# Patient Record
Sex: FEMALE | Race: WHITE | NOT HISPANIC OR LATINO | Employment: OTHER | URBAN - METROPOLITAN AREA
[De-identification: names, ages, dates, MRNs, and addresses within clinical notes are randomized per-mention and may not be internally consistent; named-entity substitution may affect disease eponyms.]

---

## 2017-05-16 ENCOUNTER — GENERIC CONVERSION - ENCOUNTER (OUTPATIENT)
Dept: OTHER | Facility: OTHER | Age: 82
End: 2017-05-16

## 2018-10-11 ENCOUNTER — OFFICE VISIT (OUTPATIENT)
Dept: GASTROENTEROLOGY | Facility: CLINIC | Age: 83
End: 2018-10-11
Payer: MEDICARE

## 2018-10-11 VITALS
HEART RATE: 60 BPM | DIASTOLIC BLOOD PRESSURE: 80 MMHG | WEIGHT: 90 LBS | BODY MASS INDEX: 18.14 KG/M2 | RESPIRATION RATE: 16 BRPM | HEIGHT: 59 IN | SYSTOLIC BLOOD PRESSURE: 140 MMHG

## 2018-10-11 DIAGNOSIS — D64.9 ANEMIA, UNSPECIFIED TYPE: ICD-10-CM

## 2018-10-11 DIAGNOSIS — Z87.74 PERSONAL HISTORY OF ARTERIAL VENOUS MALFORMATION (AVM): Primary | ICD-10-CM

## 2018-10-11 PROCEDURE — 99214 OFFICE O/P EST MOD 30 MIN: CPT | Performed by: INTERNAL MEDICINE

## 2018-10-11 RX ORDER — LOSARTAN POTASSIUM 100 MG/1
100 TABLET ORAL DAILY
COMMUNITY

## 2018-10-11 RX ORDER — OMEGA-3 FATTY ACIDS/FISH OIL 300-1000MG
CAPSULE ORAL
COMMUNITY
End: 2019-12-31 | Stop reason: ALTCHOICE

## 2018-10-11 RX ORDER — MELATONIN
1000 DAILY
COMMUNITY

## 2018-10-11 RX ORDER — SPIRONOLACTONE 100 MG/1
100 TABLET, FILM COATED ORAL DAILY
COMMUNITY
End: 2019-12-31 | Stop reason: ALTCHOICE

## 2018-10-11 NOTE — LETTER
October 11, 2018     Hugo Maradiaga MD  [address] [facility] [address] [facility]    Patient: John Razo   YOB: 1919   Date of Visit: 10/11/2018       Dear Dr Astrid Luciano: Thank you for referring Jonny Mackay to me for evaluation  Below are my notes for this consultation  If you have questions, please do not hesitate to call me  I look forward to following your patient along with you  Sincerely,        Jean Tran MD        CC: No Recipients  Jean Tran MD  10/11/2018  2:23 PM  Sign at close encounter  [facility]      Chief Complaint: history of arterial venous malformation, recent low blood count    HPI:  John Razo is a 80 y o   female who presents with history of arterial venous malformation and recent abnormal blood count  Three weeks ago patient had blood work performed at [facility] and she was told that she had a low blood count, results are not currently available to me  It was discussed she have her physician there to please send office notes and blood work to me  She was evaluated for this 5 years ago in 2013 and was 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 which was treated with cauterization during colonoscopy  She denies any other GI related symptoms  No melena hematochezia rectal bleeding  No dizziness, loss of consciousness, shortness of breath or chest pain  She currently only takes blood pressure medication for hypertension at this time and she has spinal stenosis  Otherwise, she has no other complaints  It was discussed in detail the importance of following with her primary care physician        She has family history of cardiac issues in her mother  She herself has possible history of myocardial infarction  The patient transferred her care to where she lives because of the distance involved in driving    We had a long discussion about following up with her physician for future management  Review of Systems:   Constitutional: No fever or chills, feels well, no tiredness, no recent weight gain or weight loss  HENT: No complaints of earache, no hearing loss, no nosebleeds, no nasal discharge, no sore throat, no hoarseness  Eyes: No complaints of eye pain, no red eyes, no discharge from eyes, no itchy eyes  Cardiovascular: No complaints of slow heart rate, no fast heart rate, no chest pain, no palpitations, no leg claudication, no lower extremity edema  Respiratory: No complaints of shortness of breath, no wheezing, no cough, no SOB on exertion, no orthopnea  Gastrointestinal: As noted in HPI  Genitourinary: No complaints of dysuria, no incontinence, no hesitancy, no nocturia  Musculoskeletal:   Positive left arm, low back, right foot pain  Neurological: No complaints of headache, no confusion, no convulsions, no numbness or tingling, no dizziness or fainting, no limb weakness, no difficulty walking  Skin: No complaints of skin lesions, no itching, no skin wound, no dry skin  acanthosis    Hematological/Lymphatic: No complaints of swollen glands, does not bleed easy  Allergic/Immunologic: No immunocompromised state  Endocrine:  No complaints of polyuria, no polydipsia  Psychiatric/Behavioral: is not suicidal, no sleep disturbances, no anxiety or depression, no change in personality, no emotional problems  Historical Information   No past medical history on file  No past surgical history on file  Social History   History   Alcohol Use No     History   Drug Use No     History   Smoking Status    Never Smoker   Smokeless Tobacco    Never Used     No family history on file  Current Medications: has a current medication list which includes the following prescription(s): cholecalciferol, ibuprofen, losartan, probiotic-10, and spironolactone          Vital Signs: /80   Pulse 60   Resp 16   Ht 4' 11" (1 499 m)   Wt 40 8 kg (90 lb)   BMI 18 18 kg/m² Physical Exam:   Constitutional  General Appearance: No acute distress, well appearing and well nourished, appears much younger than her stated age  Head  Normocephalic  Eyes  Conjunctivae and lids: No swelling, erythema, or discharge  Pupils and irises: Equal, round and reactive to light  Ears, Nose, Mouth, and Throat  External inspection of ears and nose: Normal  Nasal mucosa, septum and turbinates: Normal without edema or erythema/   Oropharynx: Normal with no erythema, edema, exudate or lesions  Neck  Normal range of motion  Neck supple  Cardiovascular  Auscultation of the heart: Normal rate and rhythm, normal S1 and S2 without murmurs  Examination of the extremities for edema and/or varicosities: Normal  Pulmonary/Chest  Respiratory effort: No increased work of breathing or signs of respiratory distress  Auscultation of lungs: Clear to auscultation, equal breath sounds bilaterally, no wheezes, rales, no rhonchi  Abdomen  Abdomen: Non-tender, no masses  Liver and spleen: No hepatomegaly or splenomegaly  Musculoskeletal  Gait and station: normal   Digits and Nails: normal without clubbing or cyanosis  Inspection/palpation of joints, bones, and muscles: Normal  Neurological  No nystagmus or asterixis  Skin  Skin and subcutaneous tissue: Normal without rashes or lesions  Lymphatic  Palpation of the lymph nodes in neck: No lymphadenopathy     Psychiatric  Orientation to person, place and time: Normal   Mood and affect: Normal          Labs:  Lab Results   Component Value Date    ALT 26 10/27/2015    AST 21 10/27/2015    BUN 21 10/27/2015    CALCIUM 9 3 10/27/2015     10/27/2015    CHOL 219 09/24/2014    CO2 25 10/27/2015    CREATININE 1 07 10/27/2015     09/24/2014    HCT 37 6 10/27/2015    HGB 12 6 10/27/2015     10/27/2015    K 4 5 10/27/2015     10/27/2015    TRIG 69 09/24/2014    WBC 5 12 10/27/2015         X-Rays & Procedures:   No orders to display ______________________________________________________________________      Assessment & Plan:       1  Intestinal arterial venous malformation (AVM)  5 years ago patient was evaluated for low blood count and was found to have arterial venous malformation  She was treated with cauterization during colonoscopy  Given her age, I would not recommend repeat colonoscopy with cauterization at this time, unless her situation becomes urgent   2  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  We will follow her hemoglobin and hematocrit closely on a monthly basis and she is to be considered for oral iron supplementation  She may require transfusion at some point  Clearly would be more convenient for her to do this near to her current physical location as opposed to driving will away down here for this type of care  I have asked her to please make an appointment with her primary care as soon as possible, and to have her primary care physician call me at her convenience  Attestation:   By signing my name below, Issa Soto, attest that this documentation has been prepared under the direction and in the presence of Monse Galarza MD  Electronically Signed: Alex Leslie  10/11/18     I, Monse Galarza, personally performed the services described in this documentation  All medical record entries made by the alex were at my direction and in my presence  I have reviewed the chart and discharge instructions and agree that the record reflects my personal performance and is accurate and complete    Monse Galarza MD  10/11/18

## 2018-10-11 NOTE — PROGRESS NOTES
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 which was treated with cauterization during colonoscopy  She denies any other GI related symptoms  No melena hematochezia rectal bleeding  No dizziness, loss of consciousness, shortness of breath or chest pain  She currently only takes blood pressure medication for hypertension at this time and she has spinal stenosis  Otherwise, she has no other complaints  It was discussed in detail the importance of following with her primary care physician        She has family history of cardiac issues in her mother  She herself has possible history of myocardial infarction  The patient transferred her care to where she lives because of the distance involved in driving  We had a long discussion about following up with her physician for future management  Review of Systems:   Constitutional: No fever or chills, feels well, no tiredness, no recent weight gain or weight loss  HENT: No complaints of earache, no hearing loss, no nosebleeds, no nasal discharge, no sore throat, no hoarseness  Eyes: No complaints of eye pain, no red eyes, no discharge from eyes, no itchy eyes  Cardiovascular: No complaints of slow heart rate, no fast heart rate, no chest pain, no palpitations, no leg claudication, no lower extremity edema     Respiratory: No complaints of shortness of breath, no wheezing, no cough, no SOB on exertion, no orthopnea  Gastrointestinal: As noted in HPI  Genitourinary: No complaints of dysuria, no incontinence, no hesitancy, no nocturia  Musculoskeletal:   Positive left arm, low back, right foot pain  Neurological: No complaints of headache, no confusion, no convulsions, no numbness or tingling, no dizziness or fainting, no limb weakness, no difficulty walking  Skin: No complaints of skin lesions, no itching, no skin wound, no dry skin  acanthosis    Hematological/Lymphatic: No complaints of swollen glands, does not bleed easy  Allergic/Immunologic: No immunocompromised state  Endocrine:  No complaints of polyuria, no polydipsia  Psychiatric/Behavioral: is not suicidal, no sleep disturbances, no anxiety or depression, no change in personality, no emotional problems  Historical Information   No past medical history on file  No past surgical history on file  Social History   History   Alcohol Use No     History   Drug Use No     History   Smoking Status    Never Smoker   Smokeless Tobacco    Never Used     No family history on file  Current Medications: has a current medication list which includes the following prescription(s): cholecalciferol, ibuprofen, losartan, probiotic-10, and spironolactone  Vital Signs: /80   Pulse 60   Resp 16   Ht 4' 11" (1 499 m)   Wt 40 8 kg (90 lb)   BMI 18 18 kg/m²       Physical Exam:   Constitutional  General Appearance: No acute distress, well appearing and well nourished, appears much younger than her stated age  Head  Normocephalic  Eyes  Conjunctivae and lids: No swelling, erythema, or discharge  Pupils and irises: Equal, round and reactive to light  Ears, Nose, Mouth, and Throat  External inspection of ears and nose: Normal  Nasal mucosa, septum and turbinates: Normal without edema or erythema/   Oropharynx: Normal with no erythema, edema, exudate or lesions  Neck  Normal range of motion  Neck supple  Cardiovascular  Auscultation of the heart: Normal rate and rhythm, normal S1 and S2 without murmurs  Examination of the extremities for edema and/or varicosities: Normal  Pulmonary/Chest  Respiratory effort: No increased work of breathing or signs of respiratory distress  Auscultation of lungs: Clear to auscultation, equal breath sounds bilaterally, no wheezes, rales, no rhonchi  Abdomen  Abdomen: Non-tender, no masses  Liver and spleen: No hepatomegaly or splenomegaly  Musculoskeletal  Gait and station: normal   Digits and Nails: normal without clubbing or cyanosis  Inspection/palpation of joints, bones, and muscles: Normal  Neurological  No nystagmus or asterixis  Skin  Skin and subcutaneous tissue: Normal without rashes or lesions  Lymphatic  Palpation of the lymph nodes in neck: No lymphadenopathy  Psychiatric  Orientation to person, place and time: Normal   Mood and affect: Normal          Labs:  Lab Results   Component Value Date    ALT 26 10/27/2015    AST 21 10/27/2015    BUN 21 10/27/2015    CALCIUM 9 3 10/27/2015     10/27/2015    CHOL 219 09/24/2014    CO2 25 10/27/2015    CREATININE 1 07 10/27/2015     09/24/2014    HCT 37 6 10/27/2015    HGB 12 6 10/27/2015     10/27/2015    K 4 5 10/27/2015     10/27/2015    TRIG 69 09/24/2014    WBC 5 12 10/27/2015         X-Rays & Procedures:   No orders to display           ______________________________________________________________________      Assessment & Plan:       1  Intestinal arterial venous malformation (AVM)  5 years ago patient was evaluated for low blood count and was found to have arterial venous malformation  She was treated with cauterization during colonoscopy  Given her age, I would not recommend repeat colonoscopy with cauterization at this time, unless her situation becomes urgent   2  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   We will follow her hemoglobin and hematocrit closely on a monthly basis and she is to be considered for oral iron supplementation  She may require transfusion at some point  Clearly would be more convenient for her to do this near to her current physical location as opposed to driving will away down here for this type of care  I have asked her to please make an appointment with her primary care as soon as possible, and to have her primary care physician call me at her convenience  Attestation:   By signing my name below, Herbert Alva, attest that this documentation has been prepared under the direction and in the presence of Wally Adams MD  Electronically Signed: Alex Means  10/11/18     I, Wally Adams, personally performed the services described in this documentation  All medical record entries made by the alex were at my direction and in my presence  I have reviewed the chart and discharge instructions and agree that the record reflects my personal performance and is accurate and complete    Wally Adams MD  10/11/18

## 2018-10-15 ENCOUNTER — TELEPHONE (OUTPATIENT)
Dept: GASTROENTEROLOGY | Facility: CLINIC | Age: 83
End: 2018-10-15

## 2018-10-15 DIAGNOSIS — D64.9 ANEMIA, UNSPECIFIED TYPE: Primary | ICD-10-CM

## 2018-10-16 ENCOUNTER — TELEPHONE (OUTPATIENT)
Dept: GASTROENTEROLOGY | Facility: CLINIC | Age: 83
End: 2018-10-16

## 2018-10-16 ENCOUNTER — APPOINTMENT (OUTPATIENT)
Dept: LAB | Facility: CLINIC | Age: 83
End: 2018-10-16
Payer: MEDICARE

## 2018-10-16 DIAGNOSIS — D64.9 ANEMIA, UNSPECIFIED TYPE: ICD-10-CM

## 2018-10-16 LAB
BASOPHILS # BLD AUTO: 0.05 THOUSANDS/ΜL (ref 0–0.1)
BASOPHILS NFR BLD AUTO: 1 % (ref 0–1)
EOSINOPHIL # BLD AUTO: 0.04 THOUSAND/ΜL (ref 0–0.61)
EOSINOPHIL NFR BLD AUTO: 1 % (ref 0–6)
ERYTHROCYTE [DISTWIDTH] IN BLOOD BY AUTOMATED COUNT: 13.2 % (ref 11.6–15.1)
HCT VFR BLD AUTO: 34.3 % (ref 34.8–46.1)
HGB BLD-MCNC: 10.8 G/DL (ref 11.5–15.4)
IMM GRANULOCYTES # BLD AUTO: 0.03 THOUSAND/UL (ref 0–0.2)
IMM GRANULOCYTES NFR BLD AUTO: 0 % (ref 0–2)
LYMPHOCYTES # BLD AUTO: 0.96 THOUSANDS/ΜL (ref 0.6–4.47)
LYMPHOCYTES NFR BLD AUTO: 13 % (ref 14–44)
MCH RBC QN AUTO: 32.2 PG (ref 26.8–34.3)
MCHC RBC AUTO-ENTMCNC: 31.5 G/DL (ref 31.4–37.4)
MCV RBC AUTO: 102 FL (ref 82–98)
MONOCYTES # BLD AUTO: 0.42 THOUSAND/ΜL (ref 0.17–1.22)
MONOCYTES NFR BLD AUTO: 6 % (ref 4–12)
NEUTROPHILS # BLD AUTO: 5.95 THOUSANDS/ΜL (ref 1.85–7.62)
NEUTS SEG NFR BLD AUTO: 79 % (ref 43–75)
NRBC BLD AUTO-RTO: 0 /100 WBCS
PLATELET # BLD AUTO: 255 THOUSANDS/UL (ref 149–390)
PMV BLD AUTO: 11.6 FL (ref 8.9–12.7)
RBC # BLD AUTO: 3.35 MILLION/UL (ref 3.81–5.12)
WBC # BLD AUTO: 7.45 THOUSAND/UL (ref 4.31–10.16)

## 2018-10-16 PROCEDURE — 36415 COLL VENOUS BLD VENIPUNCTURE: CPT

## 2018-10-16 PROCEDURE — 85025 COMPLETE CBC W/AUTO DIFF WBC: CPT

## 2019-07-03 LAB — HBA1C MFR BLD HPLC: 5.6 %

## 2019-09-25 ENCOUNTER — TRANSCRIBE ORDERS (OUTPATIENT)
Dept: ADMINISTRATIVE | Facility: HOSPITAL | Age: 84
End: 2019-09-25

## 2019-09-25 DIAGNOSIS — E87.5 POTASSIUM (K) EXCESS: Primary | ICD-10-CM

## 2019-10-09 ENCOUNTER — HOSPITAL ENCOUNTER (OUTPATIENT)
Dept: ULTRASOUND IMAGING | Facility: HOSPITAL | Age: 84
Discharge: HOME | End: 2019-10-09
Payer: MEDICARE

## 2019-10-09 DIAGNOSIS — E87.5 POTASSIUM (K) EXCESS: ICD-10-CM

## 2019-10-09 PROCEDURE — 76770 US EXAM ABDO BACK WALL COMP: CPT

## 2019-10-28 ENCOUNTER — TELEPHONE (OUTPATIENT)
Dept: GASTROENTEROLOGY | Facility: CLINIC | Age: 84
End: 2019-10-28

## 2019-10-30 ENCOUNTER — TRANSCRIBE ORDERS (OUTPATIENT)
Dept: NEPHROLOGY | Facility: CLINIC | Age: 84
End: 2019-10-30

## 2019-10-30 DIAGNOSIS — N18.9 CHRONIC KIDNEY DISEASE (CKD): Primary | ICD-10-CM

## 2019-11-11 ENCOUNTER — TELEPHONE (OUTPATIENT)
Dept: NEPHROLOGY | Facility: CLINIC | Age: 84
End: 2019-11-11

## 2019-11-11 NOTE — TELEPHONE ENCOUNTER
Daya Humphries canceled her nephrology consult for tomorrow 11/12/19 with Dr Alyssia Mayberry and said she wanted to schedule for December early afternoon   I schedule Daya Humphries for 12/31/19 @ 1:30pm with Dr Darrelyn Rinne

## 2019-12-31 ENCOUNTER — CONSULT (OUTPATIENT)
Dept: NEPHROLOGY | Facility: CLINIC | Age: 84
End: 2019-12-31
Payer: MEDICARE

## 2019-12-31 VITALS
HEART RATE: 78 BPM | RESPIRATION RATE: 18 BRPM | DIASTOLIC BLOOD PRESSURE: 70 MMHG | HEIGHT: 57 IN | SYSTOLIC BLOOD PRESSURE: 140 MMHG | WEIGHT: 88.6 LBS | BODY MASS INDEX: 19.12 KG/M2 | TEMPERATURE: 97.7 F

## 2019-12-31 DIAGNOSIS — M54.16 LUMBAR RADICULOPATHY: ICD-10-CM

## 2019-12-31 DIAGNOSIS — N18.4 STAGE 4 CHRONIC KIDNEY DISEASE (HCC): ICD-10-CM

## 2019-12-31 DIAGNOSIS — I10 ESSENTIAL HYPERTENSION: ICD-10-CM

## 2019-12-31 DIAGNOSIS — M89.9 CHRONIC KIDNEY DISEASE-MINERAL AND BONE DISORDER: Primary | ICD-10-CM

## 2019-12-31 DIAGNOSIS — N18.9 CHRONIC KIDNEY DISEASE (CKD): ICD-10-CM

## 2019-12-31 DIAGNOSIS — E83.9 CHRONIC KIDNEY DISEASE-MINERAL AND BONE DISORDER: Primary | ICD-10-CM

## 2019-12-31 DIAGNOSIS — N18.9 CHRONIC KIDNEY DISEASE-MINERAL AND BONE DISORDER: Primary | ICD-10-CM

## 2019-12-31 PROCEDURE — 99204 OFFICE O/P NEW MOD 45 MIN: CPT | Performed by: INTERNAL MEDICINE

## 2019-12-31 RX ORDER — LATANOPROST 0.05 MG/ML
SOLUTION/ DROPS OPHTHALMIC; TOPICAL
Refills: 4 | COMMUNITY
Start: 2019-10-31

## 2019-12-31 RX ORDER — FUROSEMIDE 20 MG/1
20 TABLET ORAL DAILY
COMMUNITY

## 2019-12-31 RX ORDER — HYDROCHLOROTHIAZIDE 25 MG/1
TABLET ORAL
COMMUNITY
End: 2019-12-31 | Stop reason: ALTCHOICE

## 2019-12-31 NOTE — ASSESSMENT & PLAN NOTE
Blood pressure is very well control with present medication which I will continue     She is on ARB blocker which I will continue

## 2019-12-31 NOTE — PROGRESS NOTES
NEPHROLOGY OFFICE CONSULT  Andrew Todd 80 y o  female MRN: [id number]    Encounter: [id number] 12/31/2019    REASON FOR VISIT: Andrew Todd is a 80 y  o female who was referred by Dennis Osborne MD for evaluation of Chronic Kidney Disease and Consult    HPI:    Nguyen Pulido came in today for evaluation management of CKD  One [de-identified] year old woman was found to have fluctuating kidney function by routine testing and was advised to see me  Patient claims her kidney function was quite bed late last year  Gradually got better  She was also told that she has 1 functioning kidney only  Her main concern is leg swelling which is more on right than left but is bothering her    She denies any shortness of breath no chest pain  She does have back pain for which she used to take nonsteroidal pain killer but not taking any more    She was on multiple diuretic which has been stopped right now and she is on in 1 diuretic      REVIEW OF SYSTEMS:    Review of Systems   Constitutional: Negative for activity change and fatigue  HENT: Negative for congestion and ear discharge  Eyes: Negative for photophobia and pain  Respiratory: Negative for apnea, cough, choking, chest tightness and shortness of breath  Cardiovascular: Positive for leg swelling  Negative for chest pain and palpitations  Gastrointestinal: Negative for abdominal distention, abdominal pain, blood in stool and nausea  Endocrine: Negative for heat intolerance and polyphagia  Genitourinary: Negative for difficulty urinating, flank pain and urgency  Musculoskeletal: Positive for arthralgias and back pain  Negative for neck pain and neck stiffness  Skin: Negative for color change and wound  Allergic/Immunologic: Negative for food allergies and immunocompromised state  Neurological: Negative for seizures and facial asymmetry  Hematological: Negative for adenopathy  Does not bruise/bleed easily     Psychiatric/Behavioral: Negative for self-injury and suicidal ideas  PAST MEDICAL HISTORY:  Past Medical History:   Diagnosis Date    Anemia     Aortic valve disease     Chronic kidney disease     Hypertension     Intestinal angiodysplasia     Lumbar radiculopathy     Osteoporosis        PAST SURGICAL HISTORY:  Past Surgical History:   Procedure Laterality Date    CATARACT EXTRACTION      FOOT SURGERY         SOCIAL HISTORY:  Social History     Substance and Sexual Activity   Alcohol Use No     Social History     Substance and Sexual Activity   Drug Use No     Social History     Tobacco Use   Smoking Status Never Smoker   Smokeless Tobacco Never Used       FAMILY HISTORY:  Family History   Problem Relation Age of Onset    Heart disease Mother        MEDICATIONS:    Current Outpatient Medications:     cholecalciferol (VITAMIN D3) 1,000 units tablet, Take 1,000 Units by mouth daily, Disp: , Rfl:     furosemide (LASIX) 20 mg tablet, Take 20 mg by mouth daily, Disp: , Rfl:     losartan (COZAAR) 100 MG tablet, Take 100 mg by mouth daily, Disp: , Rfl:     XELPROS 0 005 % EMUL, INSTILL 1 DROP DAILY IN EACH EYE AS DIRECTED, Disp: , Rfl: 4    PHYSICAL EXAM:  Vitals:    12/31/19 1320   BP: 140/70   BP Location: Right arm   Patient Position: Sitting   Pulse: 78   Resp: 18   Temp: 97 7 °F (36 5 °C)   TempSrc: Oral   Weight: 40 2 kg (88 lb 9 6 oz)   Height: 4' 9" (1 448 m)     Body mass index is 19 17 kg/m²  Physical Exam   Constitutional: She is oriented to person, place, and time  She appears well-developed  No distress  HENT:   Head: Normocephalic and atraumatic  Mouth/Throat: Oropharynx is clear and moist    Eyes: Pupils are equal, round, and reactive to light  Conjunctivae and EOM are normal  No scleral icterus  Neck: Normal range of motion  Neck supple  No JVD present  Cardiovascular: Normal rate and regular rhythm  Murmur heard  Pulmonary/Chest: Effort normal and breath sounds normal  No respiratory distress   She has no wheezes  She has no rales  Abdominal: Soft  Bowel sounds are normal  There is no tenderness  Musculoskeletal: Normal range of motion  She exhibits edema  Neurological: She is alert and oriented to person, place, and time  Skin: Skin is warm  No rash noted  Psychiatric: She has a normal mood and affect  Her behavior is normal        LAB RESULTS:  Results for orders placed or performed in visit on 07/03/19   Hemoglobin A1C   Result Value Ref Range    Hemoglobin A1C 5 6        ASSESSMENT and PLAN:    Stage 4 chronic kidney disease ([address] )  Her GFR is about 27  It is improving slowly or period of time  Likely because of avoiding nephrotoxic medicine like nonsteroidal pain killer and reducing diuretic  She also has 1 functioning kidney as other kidney cannot be seen on ultrasound  I discussed everything at length with her  Advised to avoid nephrotoxic medicine  Discussed with her about not increasing diuretic if possible and treat leg swelling with either stockings and elevation of leg    At her age my goal will be to keep her comfortable  I will not add any medication at this point  I will monitor kidney function    Chronic kidney disease-mineral and bone disorder  I will check PTH and phosphorus along with vitamin-D as part of the CKD management    Hypertension  Blood pressure is very well control with present medication which I will continue  She is on ARB blocker which I will continue    Lumbar radiculopathy  She does have spinal stenosis but not taking any pain killer  Advised to take Tylenol if needed    I think patient is stable overall looks quite good for her age  I discussed management of leg swelling with her  She claims swelling was much better 2 weeks ago when medicine was changed but she cannot be sure what medicine was changed  I will discussed with you about that    Thank you very much for the consultation I will monitor the patient closely with you        Portions of the record may have been created with voice recognition software  Occasional wrong word or "sound a like" substitutions may have occurred due to the inherent limitations of voice recognition software  Read the chart carefully and recognize, using context, where substitutions have occurred  If you have any questions, please contact the dictating provider

## 2019-12-31 NOTE — PATIENT INSTRUCTIONS
Chronic Kidney Disease   AMBULATORY CARE:   Chronic kidney disease (CKD)  is the gradual and permanent loss of kidney function  Normally, the kidneys remove fluid, chemicals, and waste from your blood  These wastes are turned into urine by your kidneys  CKD may worsen over time and lead to kidney failure  Common symptoms include the following:   · Changes in how often you need to urinate    · Swelling in your arms, legs, or feet    · Shortness of breath    · Fatigue or weakness    · Bad or bitter taste in your mouth    · Nausea, vomiting, or loss of appetite  Seek care immediately if:   · You are confused and very drowsy  · You have a seizure  · You have shortness of breath  Contact your healthcare provider if:   · You suddenly gain or lose more weight than your healthcare provider has told you is okay  · You have itchy skin or a rash  · You urinate more or less than you normally do  · You have blood in your urine  · You have nausea and repeated vomiting  · You have fatigue or muscle weakness  · You have hiccups that will not stop  · You have questions or concerns about your condition or care  Treatment for CKD:  Medicines may be given to decrease blood pressure and get rid of extra fluid  You may also receive medicine to manage health conditions that may occur with CKD  Dialysis is a treatment to remove chemicals and waste from your blood when your kidneys can no longer do this  Surgery may be needed to create an arteriovenous fistula (AVF) in your arm or insert a catheter into your abdomen so that you can receive dialysis  A kidney transplant may be done if your CKD becomes severe  Manage CKD:   · Maintain a healthy weight  Ask your healthcare provider how much you should weigh  Ask him to help you create a weight loss plan if you are overweight  · Exercise 30 to 60 minutes a day, 4 to 7 times a week, or as directed  Ask about the best exercise plan for you   Regular exercise can help you manage CKD, high blood pressure, and diabetes  · Follow your healthcare provider's advice about what to eat and drink  He may tell you to eat food low in sodium (salt), potassium, phosphorus, or protein  You may need to see a dietitian if you need help planning meals  Ask how much liquid to drink each day and which liquids are best for you  · Limit alcohol  Ask how much alcohol is safe for you to drink  A drink of alcohol is 12 ounces of beer, 5 ounces of wine, or 1½ ounces of liquor  · Do not smoke  Nicotine and other chemicals in cigarettes and cigars can cause lung and kidney damage  Ask your healthcare provider for information if you currently smoke and need help to quit  E-cigarettes or smokeless tobacco still contain nicotine  Talk to your healthcare provider before you use these products  · Ask your healthcare provider if you need vaccines  Infections such as pneumonia, influenza, and hepatitis can be more harmful or more likely to occur in a person who has CKD  Vaccines reduce your risk of infection with these viruses  Follow up with your healthcare provider as directed:  Write down your questions so you remember to ask them during your visits  © 2017 [employer] Information is for End User's use only and may not be sold, redistributed or otherwise used for commercial purposes  All illustrations and images included in CareNotes® are the copyrighted property of [employer]  or Awais Kuhn  The above information is an  only  It is not intended as medical advice for individual conditions or treatments  Talk to your doctor, nurse or pharmacist before following any medical regimen to see if it is safe and effective for you

## 2019-12-31 NOTE — ASSESSMENT & PLAN NOTE
Her GFR is about 27  It is improving slowly or period of time  Likely because of avoiding nephrotoxic medicine like nonsteroidal pain killer and reducing diuretic  She also has 1 functioning kidney as other kidney cannot be seen on ultrasound  I discussed everything at length with her  Advised to avoid nephrotoxic medicine  Discussed with her about not increasing diuretic if possible and treat leg swelling with either stockings and elevation of leg    At her age my goal will be to keep her comfortable  I will not add any medication at this point    I will monitor kidney function

## 2020-01-15 ENCOUNTER — TELEPHONE (OUTPATIENT)
Dept: NEPHROLOGY | Facility: CLINIC | Age: 85
End: 2020-01-15

## 2020-01-15 NOTE — TELEPHONE ENCOUNTER
Patient of Dr Celina Hollingsworth called and left a message on the voice mail stating that the Visiting Nursing would like for her to increase her Furosamide 20 mg because of the swelling in her feet and legs  I did call the patient back and left her a message stating that we are putting the message in for the Doctor and that we will give her a call back when we hear back from the Doctor, but if she had any more questions that she can call us back  Please call patient @ [phone] to advise her about the medication   Kni Elder,

## 2020-01-16 NOTE — TELEPHONE ENCOUNTER
I called and spoke to the patient and explained to her that per Dr Talia Brown that it is okay to increase her Furosemide  The patient understood and was okay with it   Dorene Shaw,

## 2020-02-03 LAB
CREAT ?TM UR-SCNC: 97.1 UMOL/L
EXT PROTEIN URINE: 16.1
PROT/CREAT UR: 166 MG/G{CREAT}

## 2020-03-24 DIAGNOSIS — N18.4 STAGE 4 CHRONIC KIDNEY DISEASE (HCC): Primary | ICD-10-CM

## 2021-06-18 ENCOUNTER — TELEPHONE (OUTPATIENT)
Dept: NEPHROLOGY | Facility: CLINIC | Age: 86
End: 2021-06-18

## 2021-06-18 NOTE — TELEPHONE ENCOUNTER
Patient of Dr Reilly Mayberry has an up coming appointment on Monday 7/12/2021 with Dr Reilly Mayberry for a follow up  Patient would like to have lab orders mailed to her so labs can be done before her appointment  If any questions please call patient at [phone]   Mckenzie Villalobos,

## 2021-07-02 LAB
APPEARANCE UR: CLEAR
BILIRUB UR QL STRIP: NEGATIVE
COLOR UR: YELLOW
CREAT UR-MCNC: 78.6 MG/DL
GLUCOSE UR QL: NEGATIVE
HGB UR QL STRIP: NEGATIVE
KETONES UR QL STRIP: NEGATIVE
LEUKOCYTE ESTERASE UR QL STRIP: NEGATIVE
MICRO URNS: NORMAL
NITRITE UR QL STRIP: NEGATIVE
PH UR STRIP: 5.5 [PH] (ref 5–7.5)
PROT UR QL STRIP: NEGATIVE
PROT UR-MCNC: 13.7 MG/DL
PROT/CREAT UR: 174 MG/G CREAT (ref 0–200)
SP GR UR: 1.02 (ref 1–1.03)
UROBILINOGEN UR STRIP-ACNC: 0.2 MG/DL (ref 0.2–1)

## 2021-07-09 ENCOUNTER — TELEPHONE (OUTPATIENT)
Dept: NEPHROLOGY | Facility: CLINIC | Age: 86
End: 2021-07-09

## 2021-07-09 NOTE — TELEPHONE ENCOUNTER
I called and left a message on machine for patient to return our call about confirming appointment   Regulo Garcia,

## 2021-07-11 ENCOUNTER — TELEPHONE (OUTPATIENT)
Dept: OTHER | Facility: OTHER | Age: 86
End: 2021-07-11

## 2021-07-11 NOTE — TELEPHONE ENCOUNTER
The pt canceled her appointment on 7-  The pt would like to have a virtual visit with the doctor if available  The pt has no transportation

## 2021-07-12 ENCOUNTER — TELEPHONE (OUTPATIENT)
Dept: NEPHROLOGY | Facility: CLINIC | Age: 86
End: 2021-07-12

## 2021-07-12 NOTE — TELEPHONE ENCOUNTER
Called pt and informed her that as per dr Mary Clark her urinalysis is normal there is not protein   Pt was happy to hear

## 2021-07-12 NOTE — TELEPHONE ENCOUNTER
Received msg in the inbox stating patient cancelled 9 am appt with Dr Carrie Metcalf due to no transportation and wanted to switch to virtual   Called patient back- No Answer - left message for her to call us back

## 2021-07-12 NOTE — TELEPHONE ENCOUNTER
Patient of Dr Vic Wilson called and stating that she cancel her appointment because she did not have a ride in for her appointment and that she is not feeling well  Patient would like to know if Dr Vic Wilson would give her a call back to go over her urine test results with her  Please call patient @ [phone]   Colin Steinberg,

## 2022-07-28 ENCOUNTER — OFFICE VISIT (OUTPATIENT)
Dept: DERMATOLOGY | Facility: CLINIC | Age: 87
End: 2022-07-28
Payer: MEDICARE

## 2022-07-28 VITALS — HEIGHT: 59 IN | WEIGHT: 90 LBS | BODY MASS INDEX: 18.14 KG/M2

## 2022-07-28 DIAGNOSIS — L82.1 SEBORRHEIC KERATOSIS: ICD-10-CM

## 2022-07-28 DIAGNOSIS — Z13.89 SCREENING FOR SKIN CONDITION: ICD-10-CM

## 2022-07-28 DIAGNOSIS — L82.0 INFLAMED SEBORRHEIC KERATOSIS: Primary | ICD-10-CM

## 2022-07-28 PROCEDURE — 99203 OFFICE O/P NEW LOW 30 MIN: CPT | Performed by: DERMATOLOGY

## 2022-07-28 PROCEDURE — 17110 DESTRUCTION B9 LES UP TO 14: CPT | Performed by: DERMATOLOGY

## 2022-07-28 RX ORDER — METOPROLOL SUCCINATE 25 MG/1
25 TABLET, EXTENDED RELEASE ORAL DAILY
COMMUNITY

## 2022-07-28 RX ORDER — GABAPENTIN 300 MG/1
CAPSULE ORAL
COMMUNITY
Start: 2022-07-05

## 2022-07-28 NOTE — PATIENT INSTRUCTIONS
Inflamed keratosis lesion treated because the patient concern and irritation  Seborrheic Keratosis  Patient reasurred these are normal growths we acquire with age no treatment needed  Screening for Dermatologic Disorders: Nothing else of concern noted on complete exam follow up in 1 year  Treatment with Cryotherapy    The doctor has treated your skin with nitrogen, which is 320 degrees Fahrenheit below zero  He has given the treated area "frostbite "    Stinging should subside within a few hours  You can take Tylenol for pain, if needed  Over the next few days, it is normal if the area becomes reddened, a blood blister, or swollen with fluid  If the lesion treated was near the eye - you could get a swollen eye over the next few days  Do not panic! This is all temporary, and will resolve with time  There is no special treatment - just keep the area clean  Makeup and BandAids can be used, if preferred  When the area starts to dry up and peel off, using Vaseline can help healing  It usually takes up to a month for it to heal   Some lesions are recurrent and may require repeat treatments  If a lesion has not healed in one month, please don't hesitate to contact us  If you have any further questions that are not answered here, please call us  [phone]    Thank you for allowing us to care for you

## 2022-07-28 NOTE — PROGRESS NOTES
[address] [facility] DERMATOLOGY  [address] [address]  [phone]  [phone]     MRN: [id number] : 1919  Encounter: [id number]  Patient Information: Sadi Oneill  Chief complaint:  Skin cancer checkup    History of present illness:  8 year old female who had not seen for 8 years presents because concerned regarding some growths on her skin no other concerns noted  No other concerns noted  Past Medical History:   Diagnosis Date    Anemia     Aortic valve disease     Chronic kidney disease     Hypertension     Intestinal angiodysplasia     Lumbar radiculopathy     Osteoporosis      Past Surgical History:   Procedure Laterality Date    CATARACT EXTRACTION      FOOT SURGERY       Social History   Social History     Substance and Sexual Activity   Alcohol Use No     Social History     Substance and Sexual Activity   Drug Use No     Social History     Tobacco Use   Smoking Status Never Smoker   Smokeless Tobacco Never Used     Family History   Problem Relation Age of Onset    Heart disease Mother      Meds/Allergies   Allergies   Allergen Reactions    Sulfa Antibiotics     Tramadol        Meds:  Prior to Admission medications    Medication Sig Start Date End Date Taking?  Authorizing Provider   cholecalciferol (VITAMIN D3) 1,000 units tablet Take 1,000 Units by mouth daily   Yes Historical Provider, MD   furosemide (LASIX) 20 mg tablet Take 20 mg by mouth daily   Yes Historical Provider, MD   losartan (COZAAR) 100 MG tablet Take 100 mg by mouth daily   Yes Historical Provider, MD   metoprolol succinate (TOPROL-XL) 25 mg 24 hr tablet Take 25 mg by mouth daily   Yes Historical Provider, MD   gabapentin (NEURONTIN) 300 mg capsule TAKE 1 TO 2 CAPSULES BY MOUTH EVERY DAY AS NEEDED FOR SEVERE PAIN 22   Historical Provider, MD   XELPROS 0 005 % EMUL INSTILL 1 DROP DAILY IN [facility] EYE AS DIRECTED  Patient not taking: Reported on 2022 10/31/19   Historical Provider, MD       Subjective:     Review of Systems:    General: negative for - chills, fatigue, fever,  weight gain or weight loss  Psychological: negative for - anxiety, behavioral disorder, concentration difficulties, decreased libido, depression, irritability, memory difficulties, mood swings, sleep disturbances or suicidal ideation  ENT: negative for - hearing difficulties , nasal congestion, nasal discharge, oral lesions, sinus pain, sneezing, sore throat  Allergy and Immunology: negative for - hives, insect bite sensitivity,  Hematological and Lymphatic: negative for - bleeding problems, blood clots,bruising, swollen lymph nodes  Endocrine: negative for - hair pattern changes, hot flashes, malaise/lethargy, mood swings, palpitations, polydipsia/polyuria, skin changes, temperature intolerance or unexpected weight change  Respiratory: negative for - cough, hemoptysis, orthopnea, shortness of breath, or wheezing  Cardiovascular: negative for - chest pain, dyspnea on exertion, edema,  Gastrointestinal: negative for - abdominal pain, nausea/vomiting  Genito-Urinary: negative for - dysuria, incontinence, irregular/heavy menses or urinary frequency/urgency  Musculoskeletal: negative for - gait disturbance, joint pain, joint stiffness, joint swelling, muscle pain, muscular weakness  Dermatological:  As in HPI  Neurological: negative for confusion, dizziness, headaches, impaired coordination/balance, memory loss, numbness/tingling, seizures, speech problems, tremors or weakness       Objective:   Ht 4' 11" (1 499 m)   Wt 40 8 kg (90 lb)   BMI 18 18 kg/m²     Physical Exam:    General Appearance:    Alert, cooperative, no distress   Head:    Normocephalic, without obvious abnormality, atraumatic           Skin:   A full skin exam was performed including scalp, head scalp, eyes, ears, nose, lips, neck, chest, axilla, abdomen, back, buttocks, bilateral upper extremities, bilateral lower extremities, hands, feet, fingers, toes, fingernails, and toenails inflamed keratotic papules greasy stuck appearance on left cheek normal keratotic papules with greasy stuck on appearance nothing else atypical noted exam  Cryotherapy Procedure Note    Pre-operative Diagnosis: inflamed seborrheic keratosis    Plan:  1  Instructed to keep the area dry and clean thereafter  Apply petrolatum if area gets crusty  2  Recommended that the patient use acetaminophen  as needed for pain  Locations:  Left cheek    Indications: Destruction of irritated keratoses x2    Patient informed of risks (permanent scarring, infection, light or dark discoloration, bleeding, infection, weakness, numbness and recurrence of the lesion) and benefits of the procedure and verbal informed consent obtained  The areas are treated with liquid nitrogen therapy, frozen until ice ball extended 2 mm beyond lesion, allowed to thaw, and treated again  The patient tolerated procedure well  The patient was instructed on post-op care, warned that there may be blister formation, redness and pain  Recommend OTC analgesia as needed for pain  Condition:  Stable    Complications:  none  Assessment:     1  Inflamed seborrheic keratosis     2  Seborrheic keratosis     3  Screening for skin condition           Plan:   Inflamed keratosis lesion treated because the patient concern and irritation  Seborrheic Keratosis  Patient reasurred these are normal growths we acquire with age no treatment needed    Screening for Dermatologic Disorders: Nothing else of concern noted on complete exam follow up in 1 year       Yumiko Nam MD  7/28/2022,11:31 AM    Portions of the record may have been created with voice recognition software   Occasional wrong word or "sound a like" substitutions may have occurred due to the inherent limitations of voice recognition software   Read the chart carefully and recognize, using context, where substitutions have occurred

## 2022-08-11 ENCOUNTER — OFFICE VISIT (OUTPATIENT)
Dept: DERMATOLOGY | Facility: CLINIC | Age: 87
End: 2022-08-11
Payer: MEDICARE

## 2022-08-11 VITALS — WEIGHT: 90 LBS | HEIGHT: 59 IN | BODY MASS INDEX: 18.14 KG/M2

## 2022-08-11 DIAGNOSIS — T14.8XXA EXCORIATION: Primary | ICD-10-CM

## 2022-08-11 PROCEDURE — 99212 OFFICE O/P EST SF 10 MIN: CPT | Performed by: DERMATOLOGY

## 2022-08-11 NOTE — PROGRESS NOTES
[facility] DERMATOLOGY  [address] [place] [id number]  [phone]  [phone]     MRN: [id number] : 1919  Encounter: [id number]  Patient Information: Mack Owen    Subjective:     8 year old female presents for concerns regarding the spots on the left cheek we had previously frozen some inflamed keratosis and she is concerned that the area has not completely healed and she feels other lesions in the area     Objective:   Ht 4' 11" (1 499 m)   Wt 40 8 kg (90 lb)   BMI 18 18 kg/m²     Physical Exam:    General Appearance:    Alert, cooperative, no distress   Skin:   Excoriated papules some keratotic papules no discernible primary process at this time     Assessment:     1  Excoriation           Plan: At present and really cannot see any discernible primary process I think she is picking at the area little bit causing some irritation met present would hold off on any intervention unless the seems to be actively growing or continue to be an issue      Prior to Admission medications    Medication Sig Start Date End Date Taking?  Authorizing Provider   cholecalciferol (VITAMIN D3) 1,000 units tablet Take 1,000 Units by mouth daily    Historical Provider, MD   furosemide (LASIX) 20 mg tablet Take 20 mg by mouth daily    Historical Provider, MD   gabapentin (NEURONTIN) 300 mg capsule TAKE 1 TO 2 CAPSULES BY MOUTH EVERY DAY AS NEEDED FOR SEVERE PAIN 22   Historical Provider, MD   losartan (COZAAR) 100 MG tablet Take 100 mg by mouth daily    Historical Provider, MD   metoprolol succinate (TOPROL-XL) 25 mg 24 hr tablet Take 25 mg by mouth daily    Historical Provider, MD Sullivan Eglin 0 005 % EMUL INSTILL 1 DROP DAILY IN [facility] EYE AS DIRECTED  Patient not taking: Reported on 2022 10/31/19   Historical Provider, MD     Allergies   Allergen Reactions    Sulfa Antibiotics     Tramadol        Luke Tang MD  2022,10:53 AM    Portions of the record may have been created with voice recognition software   Occasional wrong word or "sound a like" substitutions may have occurred due to the inherent limitations of voice recognition software   Read the chart carefully and recognize, using context, where substitutions have occurred

## 2022-08-11 NOTE — PATIENT INSTRUCTIONS
At present and really cannot see any discernible primary process I think she is picking at the area little bit causing some irritation met present would hold off on any intervention unless the seems to be actively growing or continue to be an issue